# Patient Record
Sex: FEMALE | Race: WHITE | Employment: FULL TIME | URBAN - METROPOLITAN AREA
[De-identification: names, ages, dates, MRNs, and addresses within clinical notes are randomized per-mention and may not be internally consistent; named-entity substitution may affect disease eponyms.]

---

## 2017-02-16 ENCOUNTER — OFFICE VISIT (OUTPATIENT)
Dept: NEUROLOGY | Facility: CLINIC | Age: 52
End: 2017-02-16

## 2017-02-16 VITALS — DIASTOLIC BLOOD PRESSURE: 86 MMHG | SYSTOLIC BLOOD PRESSURE: 122 MMHG | HEART RATE: 82 BPM | RESPIRATION RATE: 18 BRPM

## 2017-02-16 DIAGNOSIS — G44.219 EPISODIC TENSION-TYPE HEADACHE, NOT INTRACTABLE: Primary | ICD-10-CM

## 2017-02-16 RX ORDER — INDOMETHACIN 25 MG/1
25 CAPSULE ORAL SEE ADMIN INSTRUCTIONS
Qty: 30 CAPSULE | Refills: 1 | Status: SHIPPED | OUTPATIENT
Start: 2017-02-16 | End: 2021-11-10

## 2017-02-16 NOTE — MR AVS SNAPSHOT
After Visit Summary   2017    Estephania Vázquez    MRN: [id number]           Patient Information     Date Of Birth          1965        Visit Information        Provider Department      2017 3:30 PM Benjamin Evans MD [facility]        Today's Diagnoses     Episodic tension-type headache, not intractable    -  1       Follow-ups after your visit        Follow-up notes from your care team     Return if symptoms worsen or fail to improve.      Who to contact     Please call your clinic at [phone] to:    Ask questions about your health    Make or cancel appointments    Discuss your medicines    Learn about your test results    Speak to your doctor   If you have compliments or concerns about an experience at your clinic, or if you wish to file a complaint, please contact [facility] Patient Relations at [phone] or email us at [email or web address]         Additional Information About Your Visit        MyChart Information     Arcivrt is an electronic gateway that provides easy, online access to your medical records. With Gyft, you can request a clinic appointment, read your test results, renew a prescription or communicate with your care team.     To sign up for Arcivrt visit the website at [email or web address]   You will be asked to enter the access code listed below, as well as some personal information. Please follow the directions to create your username and password.     Your access code is: [email or web address]  Expires: 2017  1:35 PM     Your access code will  in 90 days. If you need help or a new code, please contact your [facility] or call [phone] for assistance.        Care EveryWhere ID     This is your Care EveryWhere ID. This could be used by other organizations to access your [place] medical records  [id number]        Your Vitals  Were     Pulse Respirations                82 18           Blood Pressure from Last 3 Encounters:   02/16/17 122/86    Weight from Last 3 Encounters:   11/11/14 70.3 kg (155 lb)                 Today's Medication Changes          These changes are accurate as of: 2/16/17 11:59 PM.  If you have any questions, ask your nurse or doctor.               Start taking these medicines.        Dose/Directions    indomethacin 25 MG capsule   Commonly known as:  INDOCIN   Used for:  Episodic tension-type headache, not intractable        Dose:  25 mg   Take 1 capsule (25 mg) by mouth See Admin Instructions Take one or two prior to sex.   Quantity:  30 capsule   Refills:  1            Where to get your medicines      These medications were sent to [facility] [address] [facility] [address] AT [address]  [address] [facility] [address]     Phone:  [phone]     indomethacin 25 MG capsule                Primary Care Provider Office Phone # Fax #    Abhinav Zelaya [phone]       [place] [id number]  [facility] [address]        Thank you!     Thank you for choosing [facility]  for your care. Our goal is always to provide you with excellent care. Hearing back from our patients is one way we can continue to improve our services. Please take a few minutes to complete the written survey that you may receive in the mail after your visit with us. Thank you!             Your Updated Medication List - Protect others around you: Learn how to safely use, store and throw away your medicines at www.disposemymeds.org.          This list is accurate as of: 2/16/17 11:59 PM.  Always use your most recent med list.                   Brand Name Dispense Instructions for use    DIAZEPAM PO      Take 5 mg by mouth At Bedtime       IBUPROFEN PO      Take 200 mg by mouth 3 times daily        indomethacin 25 MG capsule    INDOCIN    30 capsule    Take 1 capsule (25 mg) by mouth See Admin Instructions Take one or two prior to sex.

## 2017-02-16 NOTE — LETTER
2017       RE: Estephania Vázquez  [address]   [place] [facility] [place] [address]     Dear Colleague,    Thank you for referring your patient, Estephania Vázquez, to the [facility] at [facility]. Please see a copy of my visit note below.    2017      Abhinav Zelaya MD   [facility]    [address] [facility] [place] [address]      RE: Estephania Vázquez   MRN: [id number]   : 1965      Dear Abhinav:      Thank you for the referral of Estephania Vázquez for neurologic consultation with chief complaint of headache.  As you know, she is a 51-year-old right-handed woman.  She had headaches a year ago and now is having them again.  They are very specific.  They occur at sexual climax.  When she first had the problem a year ago she had several such events and they were predictable.  She never had sex without the headache.  Then, after a couple of months or so, they seemed to go away.  Now they are back the last couple of weeks.  The time from onset to maximum severity is a matter of seconds.  They begin explosively.  It is a global type headache.  With the most recent such event, she took 4 ibuprofen and then got a massage.  She fell asleep.  When she awoke the headache was gone.  This is pretty typical.  She does not have other neurologic symptoms with the headache.  She has no problem with vision, hearing, ringing in the ears, vertigo, speech or swallowing.  She does not get focal symptoms in her arms or legs such as numbness, tingling, pins or needles.  She has not lost consciousness.  She does not have issues with bowel or bladder control.      Her past medical history is largely noncontributory.  She does not have high blood pressure, diabetes, thyroid or asthma.  She does take an occasional Valium for restless legs syndrome to good effect.  She has not had pertinent surgery.  She  does smoke 4-5 cigarettes a day and is trying to quit.  She does not drink.  She has no history of head injury.  Social history is that she is .  She has 4 children.  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  She also is raising her 10-year-old granddaughter.  Family history is positive for headache in her father.      On examination, the patient is cooperative and in no distress.  Her blood pressure is 122/86.  There are no carotid bruits.  Auscultation of the heart shows S1, S2, without murmur, rub or gallop.  Chest is clear to auscultation.  On neurologic exam, the patient is alert, oriented and lucid.  Cranial nerve testing shows full visual fields to confrontation.  Funduscopic exam shows sharp discs bilaterally.  Visual acuity is 20/20 bilaterally.  Eye movements are complete and conjugate without nystagmus.  Pupils react to light.  Facies are symmetric, and tongue protrudes in the midline.  Motor evaluation shows no pronator drift, normal finger tapping, finger-nose-finger and heel-knee-shin.  She has good strength in the arms, hands and legs.  Muscle stretch reflexes are low amplitude-trace and symmetric.  Toes are downgoing.  Sensory exam shows preserved vibration and temperature in the hands and preserved vibration in the toes.  Romberg sign is absent.  She can walk on her heels and toes without difficulty.  Tandem gait is unremarkable.      ASSESSMENT:      1.  Coital headache.      DISCUSSION:  This patient is seen for evaluation headache associated with sexual climax.  She had this problem a year ago and then it went away and now it is back in the last few weeks.  Her exam on this point is normal.  I suspect this is a benign primary headache disorder.  For evaluation, I am going to obtain an MRI scan of the brain along with an MR angiogram of the head and neck to make sure there is no structural lesion, aneurysm or dissection.  I am  starting her on indomethacin to take 25 or 50 mg 30-60 minutes before sex to see if that can work as a preventive.  I will communicate the results of her imaging studies to her and followup will be arranged at that time.  If you have questions about this, please contact me.      Sincerely,      Benjamin Evans MD        D: 2017 15:57   T: 2017 16:34   MT: DINA      Name:     MONICA DELATORRE   MRN:      [id number]        Account:      [id number]   :      1965           Service Date: 2017      Document: [id number]

## 2017-02-16 NOTE — PROGRESS NOTES
2017      Abhinav Zelaya MD   [facility]    [address] [place] [address]      RE: Estephania Vázquez   MRN: [id number]   : 1965      Dear Abhinav:      Thank you for the referral of Estephania Vázquez for neurologic consultation with chief complaint of headache.  As you know, she is a 51-year-old right-handed woman.  She had headaches a year ago and now is having them again.  They are very specific.  They occur at sexual climax.  When she first had the problem a year ago she had several such events and they were predictable.  She never had sex without the headache.  Then, after a couple of months or so, they seemed to go away.  Now they are back the last couple of weeks.  The time from onset to maximum severity is a matter of seconds.  They begin explosively.  It is a global type headache.  With the most recent such event, she took 4 ibuprofen and then got a massage.  She fell asleep.  When she awoke the headache was gone.  This is pretty typical.  She does not have other neurologic symptoms with the headache.  She has no problem with vision, hearing, ringing in the ears, vertigo, speech or swallowing.  She does not get focal symptoms in her arms or legs such as numbness, tingling, pins or needles.  She has not lost consciousness.  She does not have issues with bowel or bladder control.      Her past medical history is largely noncontributory.  She does not have high blood pressure, diabetes, thyroid or asthma.  She does take an occasional Valium for restless legs syndrome to good effect.  She has not had pertinent surgery.  She does smoke 4-5 cigarettes a day and is trying to quit.  She does not drink.  She has no history of head injury.  Social history is that she is .  She has 4 children.  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  She also is raising her 10-year-old granddaughter.  Family  history is positive for headache in her father.      On examination, the patient is cooperative and in no distress.  Her blood pressure is 122/86.  There are no carotid bruits.  Auscultation of the heart shows S1, S2, without murmur, rub or gallop.  Chest is clear to auscultation.  On neurologic exam, the patient is alert, oriented and lucid.  Cranial nerve testing shows full visual fields to confrontation.  Funduscopic exam shows sharp discs bilaterally.  Visual acuity is 20/20 bilaterally.  Eye movements are complete and conjugate without nystagmus.  Pupils react to light.  Facies are symmetric, and tongue protrudes in the midline.  Motor evaluation shows no pronator drift, normal finger tapping, finger-nose-finger and heel-knee-shin.  She has good strength in the arms, hands and legs.  Muscle stretch reflexes are low amplitude-trace and symmetric.  Toes are downgoing.  Sensory exam shows preserved vibration and temperature in the hands and preserved vibration in the toes.  Romberg sign is absent.  She can walk on her heels and toes without difficulty.  Tandem gait is unremarkable.      ASSESSMENT:      1.  Coital headache.      DISCUSSION:  This patient is seen for evaluation headache associated with sexual climax.  She had this problem a year ago and then it went away and now it is back in the last few weeks.  Her exam on this point is normal.  I suspect this is a benign primary headache disorder.  For evaluation, I am going to obtain an MRI scan of the brain along with an MR angiogram of the head and neck to make sure there is no structural lesion, aneurysm or dissection.  I am starting her on indomethacin to take 25 or 50 mg 30-60 minutes before sex to see if that can work as a preventive.  I will communicate the results of her imaging studies to her and followup will be arranged at that time.  If you have questions about this, please contact me.      Sincerely,      MD DIANN Vazquez  MD WILL             D: 2017 15:57   T: 2017 16:34   MT: DINA      Name:     MONICA DELATORRE   MRN:      [id number]        Account:      [id number]   :      1965           Service Date: 2017      Document: [id number]          3/13 Addendum: reviewed normal MRI brain and MRA head and neck with pt.  Indomethacin not very helpful, but headaches seem to have resolved.  She will keep me posted and f/u prn.

## 2017-03-02 ENCOUNTER — TRANSFERRED RECORDS (OUTPATIENT)
Dept: HEALTH INFORMATION MANAGEMENT | Facility: CLINIC | Age: 52
End: 2017-03-02

## 2021-11-10 ENCOUNTER — APPOINTMENT (OUTPATIENT)
Dept: GENERAL RADIOLOGY | Facility: CLINIC | Age: 56
End: 2021-11-10
Attending: EMERGENCY MEDICINE
Payer: COMMERCIAL

## 2021-11-10 ENCOUNTER — HOSPITAL ENCOUNTER (OUTPATIENT)
Facility: CLINIC | Age: 56
Setting detail: OBSERVATION
Discharge: HOME | End: 2021-11-11
Attending: EMERGENCY MEDICINE | Admitting: INTERNAL MEDICINE
Payer: COMMERCIAL

## 2021-11-10 DIAGNOSIS — R07.9 CHEST PAIN, UNSPECIFIED TYPE: ICD-10-CM

## 2021-11-10 LAB
ALBUMIN SERPL-MCNC: 3.4 G/DL (ref 3.4–5)
ALP SERPL-CCNC: 136 U/L (ref 40–150)
ALT SERPL W P-5'-P-CCNC: 24 U/L (ref 0–50)
ANION GAP SERPL CALCULATED.3IONS-SCNC: 7 MMOL/L (ref 3–14)
AST SERPL W P-5'-P-CCNC: 10 U/L (ref 0–45)
ATRIAL RATE - MUSE: 88 BPM
BASOPHILS # BLD AUTO: 0.1 10E3/UL (ref 0–0.2)
BASOPHILS NFR BLD AUTO: 1 %
BILIRUB SERPL-MCNC: 0.3 MG/DL (ref 0.2–1.3)
BUN SERPL-MCNC: 10 MG/DL (ref 7–30)
CALCIUM SERPL-MCNC: 9 MG/DL (ref 8.5–10.1)
CHLORIDE BLD-SCNC: 109 MMOL/L (ref 94–109)
CO2 SERPL-SCNC: 23 MMOL/L (ref 20–32)
CREAT SERPL-MCNC: 0.73 MG/DL (ref 0.52–1.04)
DIASTOLIC BLOOD PRESSURE - MUSE: NORMAL MMHG
EOSINOPHIL # BLD AUTO: 0.2 10E3/UL (ref 0–0.7)
EOSINOPHIL NFR BLD AUTO: 2 %
ERYTHROCYTE [DISTWIDTH] IN BLOOD BY AUTOMATED COUNT: 12.4 % (ref 10–15)
GFR SERPL CREATININE-BSD FRML MDRD: >90 ML/MIN/1.73M2
GLUCOSE BLD-MCNC: 116 MG/DL (ref 70–99)
HBA1C MFR BLD: 5.6 % (ref 0–5.6)
HCT VFR BLD AUTO: 39.7 % (ref 35–47)
HGB BLD-MCNC: 12.9 G/DL (ref 11.7–15.7)
IMM GRANULOCYTES # BLD: 0 10E3/UL
IMM GRANULOCYTES NFR BLD: 0 %
INTERPRETATION ECG - MUSE: NORMAL
LYMPHOCYTES # BLD AUTO: 2.1 10E3/UL (ref 0.8–5.3)
LYMPHOCYTES NFR BLD AUTO: 25 %
MCH RBC QN AUTO: 28.9 PG (ref 26.5–33)
MCHC RBC AUTO-ENTMCNC: 32.5 G/DL (ref 31.5–36.5)
MCV RBC AUTO: 89 FL (ref 78–100)
MONOCYTES # BLD AUTO: 0.5 10E3/UL (ref 0–1.3)
MONOCYTES NFR BLD AUTO: 6 %
NEUTROPHILS # BLD AUTO: 5.4 10E3/UL (ref 1.6–8.3)
NEUTROPHILS NFR BLD AUTO: 66 %
NRBC # BLD AUTO: 0 10E3/UL
NRBC BLD AUTO-RTO: 0 /100
P AXIS - MUSE: 60 DEGREES
PLATELET # BLD AUTO: 389 10E3/UL (ref 150–450)
POTASSIUM BLD-SCNC: 3.6 MMOL/L (ref 3.4–5.3)
PR INTERVAL - MUSE: 160 MS
PROT SERPL-MCNC: 7.4 G/DL (ref 6.8–8.8)
QRS DURATION - MUSE: 80 MS
QT - MUSE: 378 MS
QTC - MUSE: 457 MS
R AXIS - MUSE: 32 DEGREES
RBC # BLD AUTO: 4.46 10E6/UL (ref 3.8–5.2)
SARS-COV-2 RNA RESP QL NAA+PROBE: NEGATIVE
SODIUM SERPL-SCNC: 139 MMOL/L (ref 133–144)
SYSTOLIC BLOOD PRESSURE - MUSE: NORMAL MMHG
T AXIS - MUSE: 43 DEGREES
TROPONIN I SERPL-MCNC: <0.015 UG/L (ref 0–0.04)
VENTRICULAR RATE- MUSE: 88 BPM
WBC # BLD AUTO: 8.3 10E3/UL (ref 4–11)

## 2021-11-10 PROCEDURE — 87635 SARS-COV-2 COVID-19 AMP PRB: CPT | Performed by: EMERGENCY MEDICINE

## 2021-11-10 PROCEDURE — 82040 ASSAY OF SERUM ALBUMIN: CPT | Performed by: EMERGENCY MEDICINE

## 2021-11-10 PROCEDURE — 99285 EMERGENCY DEPT VISIT HI MDM: CPT | Mod: 25

## 2021-11-10 PROCEDURE — 36415 COLL VENOUS BLD VENIPUNCTURE: CPT | Performed by: EMERGENCY MEDICINE

## 2021-11-10 PROCEDURE — 71046 X-RAY EXAM CHEST 2 VIEWS: CPT

## 2021-11-10 PROCEDURE — 36415 COLL VENOUS BLD VENIPUNCTURE: CPT | Performed by: INTERNAL MEDICINE

## 2021-11-10 PROCEDURE — 250N000013 HC RX MED GY IP 250 OP 250 PS 637: Performed by: EMERGENCY MEDICINE

## 2021-11-10 PROCEDURE — 250N000013 HC RX MED GY IP 250 OP 250 PS 637: Performed by: INTERNAL MEDICINE

## 2021-11-10 PROCEDURE — 93005 ELECTROCARDIOGRAM TRACING: CPT

## 2021-11-10 PROCEDURE — G0378 HOSPITAL OBSERVATION PER HR: HCPCS

## 2021-11-10 PROCEDURE — C9803 HOPD COVID-19 SPEC COLLECT: HCPCS

## 2021-11-10 PROCEDURE — 83036 HEMOGLOBIN GLYCOSYLATED A1C: CPT | Performed by: INTERNAL MEDICINE

## 2021-11-10 PROCEDURE — 84484 ASSAY OF TROPONIN QUANT: CPT | Mod: 91 | Performed by: INTERNAL MEDICINE

## 2021-11-10 PROCEDURE — 85025 COMPLETE CBC W/AUTO DIFF WBC: CPT | Performed by: EMERGENCY MEDICINE

## 2021-11-10 PROCEDURE — 84484 ASSAY OF TROPONIN QUANT: CPT | Performed by: EMERGENCY MEDICINE

## 2021-11-10 PROCEDURE — 99220 PR INITIAL OBSERVATION CARE,LEVEL III: CPT | Performed by: INTERNAL MEDICINE

## 2021-11-10 RX ORDER — CYCLOBENZAPRINE HCL 10 MG
10 TABLET ORAL EVERY 8 HOURS PRN
Status: DISCONTINUED | OUTPATIENT
Start: 2021-11-10 | End: 2021-11-11 | Stop reason: HOSPADM

## 2021-11-10 RX ORDER — ASPIRIN 81 MG/1
81 TABLET ORAL DAILY
Status: DISCONTINUED | OUTPATIENT
Start: 2021-11-11 | End: 2021-11-11 | Stop reason: HOSPADM

## 2021-11-10 RX ORDER — ACETAMINOPHEN 325 MG/1
650 TABLET ORAL EVERY 6 HOURS PRN
Status: DISCONTINUED | OUTPATIENT
Start: 2021-11-10 | End: 2021-11-11 | Stop reason: HOSPADM

## 2021-11-10 RX ORDER — MAGNESIUM HYDROXIDE/ALUMINUM HYDROXICE/SIMETHICONE 120; 1200; 1200 MG/30ML; MG/30ML; MG/30ML
30 SUSPENSION ORAL EVERY 4 HOURS PRN
Status: DISCONTINUED | OUTPATIENT
Start: 2021-11-10 | End: 2021-11-11 | Stop reason: HOSPADM

## 2021-11-10 RX ORDER — NITROGLYCERIN 0.4 MG/1
0.4 TABLET SUBLINGUAL EVERY 5 MIN PRN
Status: DISCONTINUED | OUTPATIENT
Start: 2021-11-10 | End: 2021-11-11 | Stop reason: HOSPADM

## 2021-11-10 RX ORDER — NITROGLYCERIN 0.4 MG/1
0.4 TABLET SUBLINGUAL EVERY 5 MIN PRN
Status: DISCONTINUED | OUTPATIENT
Start: 2021-11-10 | End: 2021-11-10

## 2021-11-10 RX ORDER — ACETAMINOPHEN 650 MG/1
650 SUPPOSITORY RECTAL EVERY 6 HOURS PRN
Status: DISCONTINUED | OUTPATIENT
Start: 2021-11-10 | End: 2021-11-11 | Stop reason: HOSPADM

## 2021-11-10 RX ADMIN — ACETAMINOPHEN 650 MG: 325 TABLET, FILM COATED ORAL at 21:55

## 2021-11-10 RX ADMIN — CYCLOBENZAPRINE 10 MG: 10 TABLET, FILM COATED ORAL at 22:42

## 2021-11-10 RX ADMIN — NITROGLYCERIN 0.4 MG: 0.4 TABLET SUBLINGUAL at 15:23

## 2021-11-10 ASSESSMENT — MIFFLIN-ST. JEOR: SCORE: 1347.92

## 2021-11-10 ASSESSMENT — ENCOUNTER SYMPTOMS
SHORTNESS OF BREATH: 1
DIAPHORESIS: 1

## 2021-11-10 NOTE — ED TRIAGE NOTES
EMS report: while driving had sudden onset of crushing chest pain from base of ribs up into jaw, became diaphoretic. Reduced prior to EMS arrival but then it came back. EMS gave nitroglycerin x2 (worst pain 9/10), currently 1-2/10. Aspirin 324 mg PO given by EMS. . PIV 20g L wrist

## 2021-11-10 NOTE — ED NOTES
Bed: ED03  Expected date:   Expected time:   Means of arrival:   Comments:  [address] [facility] 56 F chest pain

## 2021-11-10 NOTE — H&P
[facility]    History and Physical - Hospitalist Service       Date of Admission:  11/10/2021    Assessment & Plan   Estephania Vázquez is a 56 year-old female with tobacco use, family history of coronary artery disease who presents with episode of chest pain while driving. Admitted on 11/10/2021.    Chest pain  *Presents with episode of bilateral lower chest pain radiating into her sternum and jaw occurring while at rest driving. Pain began to improve spontaneously prior to EMS arrival, though did further improve following NTG.   *EKG without ischemic changes, troponin negative x2  *Risk factors for CAD include family history, ongoing tobacco use  *History of equivocal stress echocardiogram 3/2009  - Serial troponins  - NM chemical stress test in AM, she does not feel she can exercise adequately due to chronic low back symptoms   - Telemetry   - Continue aspirin daily  - NTG SL PRN  - Check HgbA1c, lipid panel for further risk stratification     Tobacco use disorder  - Discussed importance of cessation  - Declines nicotine replacement therapy     Chronic low back pain  - Typically manages with massages from her  at home, TENS unit  - Okay for home TENS unit if available  - Heat, ice, acetaminophen PRN        Diet: Combination Diet Regular Diet Adult; No Caffeine Diet  DVT Prophylaxis: Observation patient, short stay anticipated   Marvin Catheter: Not present  Code Status: Full Code      Disposition Plan   [place] to observation status. Anticipate discharge within 24 hours if clinically improved.     Entered: Tino Wright MD 11/10/2021, 7:44 PM     The patient's care was discussed with the Patient.    Clinically Significant Risk Factors Present on Admission                        Tino Wright MD  [facility]    ______________________________________________________________________    Chief Complaint   Chest pain for 1 day    History of Present Illness    Estephania Vázquez is a 56 year old female who presents with the above chief complaint.    History is obtained from the patient and review of medical record. The patient reports [place] [address] when she felt the acute onset of chest pain that began at the bottom of her ribcage on both sides, and radiated up through her sternum into her jaw. She describes the pain as a pulling, tightness sensation. She subsequently developed associated shortness of breath and diaphoresis.  She pulled over and called for help.  By the time of EMS arrival, she reports her pain had already been improving, however improved further from a 2-3 out of 10 to a 1 out of 10 after nitroglycerin was given.  Her pain subsequently increased again, improved again following an additional nitroglycerin.  She currently reports her pain has nearly resolved, she has a residual abnormal sensation that she would not quite consider pain or discomfort.  She has a history of chronic low back pain which she attributes to years of lifting her disabled son.  Typically manages with massages from her  and a TENS unit at home.  Her pain is currently exacerbated as she has not had these usual therapies.  She otherwise has been able to ambulate at her recent baseline without any limitations from chest pain or shortness of breath.  She has no known personal history of coronary artery disease.  She is an active smoker.  She denies any known history of diabetes, hypertension, hyperlipidemia.  She has a family history of coronary artery disease including her father and her brother who both had MIs in their late 40s or early 50s.    In the Emergency Department, the patient was seen by Dr. Sigala, with whom I discussed the patient's presenting symptoms and emergency department course.  Initial vital signs were a temperature of 98.2F, HR 91, /94, RR 18, SpO2 97% on room air. Work-up included CXR with without acute abnormalities, normal EKG,  undetectable troponin. Hospitalists were contacted for admission to the hospital.     Review of Systems    Complete 10 point review of systems assessed and negative except as noted in HPI.    Past Medical History    I have reviewed this patient's medical history and updated it with pertinent information if needed.   Past Medical History:   Diagnosis Date     Chronic low back pain        Past Surgical History   I have reviewed this patient's surgical history and updated it with pertinent information if needed.  History reviewed. No pertinent surgical history.      Social History   I have reviewed this patient's social history and updated it with pertinent information if needed.  Social History     Tobacco Use     Smoking status: Current Every Day Smoker     Smokeless tobacco: Never Used   Substance Use Topics     Alcohol use: Not Currently     Drug use: None     . Has 4 children.     Family History   I have reviewed this patient's family history and updated it with pertinent information if needed.   Family History   Problem Relation Age of Onset     Esophageal Cancer Mother      Colon Cancer Mother      Myocardial Infarction Father         50s     Myocardial Infarction Brother         late 40s or early 50s         Prior to Admission Medications   None     Allergies   No Known Allergies    Physical Exam   Vital Signs: Temp: 98.2  F (36.8  C) Temp src: Oral BP: 122/83 Pulse: 77   Resp: 16 SpO2: 98 % O2 Device: None (Room air)    Weight: 168 lbs 0 oz    Constitutional: Well-appearing, NAD  Eyes: PERRL, EOMI  HENT: Oropharynx clear, MMM  Respiratory: Clear to auscultation bilaterally, good air movement, normal effort   Cardiovascular: RRR. No peripheral edema.   GI: Soft, non-tender, non-distended. No rebound tenderness or guarding.    Skin: Warm, dry   Neurologic: Alert. Responding to questions appropriately. Following commands.   Psychiatric: Normal affect, appropriate      Data   Data reviewed today: I reviewed  all medications, new labs and imaging results over the last 24 hours. I personally reviewed the EKG tracing showing sinus rhythm, no ischemic ST-T wave changes.    Recent Labs   Lab 11/10/21  1714 11/10/21  1456   WBC  --  8.3   HGB  --  12.9   MCV  --  89   PLT  --  389   NA  --  139   POTASSIUM  --  3.6   CHLORIDE  --  109   CO2  --  23   BUN  --  10   CR  --  0.73   ANIONGAP  --  7   KELSEY  --  9.0   GLC  --  116*   ALBUMIN  --  3.4   PROTTOTAL  --  7.4   BILITOTAL  --  0.3   ALKPHOS  --  136   ALT  --  24   AST  --  10   TROPONIN <0.015 <0.015       Recent Results (from the past 24 hour(s))   XR Chest 2 Views    Narrative    CHEST TWO VIEWS  11/10/2021 3:11 PM     HISTORY:  Chest pain.    COMPARISON: Chest CT on 3/27/2009      Impression    IMPRESSION: PA and lateral views of the chest were obtained.  Cardiomediastinal silhouette is within normal limits. No suspicious  focal pulmonary opacities. No significant pleural effusion or  pneumothorax.    MURIEL STONE MD         SYSTEM ID:  [id number]

## 2021-11-10 NOTE — ED NOTES
"[facility]  ED Nurse Handoff Report    ED Chief complaint: Chest Pain      ED Diagnosis:   Final diagnoses:   None       Code Status: Full Code    Allergies: No Known Allergies    Patient Story: Presents with midsternal chest pain while driving today. Pain radiated to jaw and right ear. EMS administered nitroglycerin x1 with reduced pain.     Focused Assessment:  A&Ox4, Chest pain 2/10 on arrival. Nitroglycerin SL x1 here relieved chest pain. Breath sounds clear to auscultation. NSR on bedside cardiac monitor. VS stable     Treatments and/or interventions provided: labs, nitroglycerin SL.   Patient's response to treatments and/or interventions: stable, improved    To be done/followed up on inpatient unit:  admission orders    Does this patient have any cognitive concerns?: Disorientation to person and none    Activity level - Baseline/Home:  Independent  Activity Level - Current:   Independent    Patient's Preferred language: English   Needed?: No    Isolation: None  Infection: Not Applicable  Patient tested for COVID 19 prior to admission: YES  Bariatric?: No    Vital Signs:   Vitals:    11/10/21 1452 11/10/21 1600   BP: (!) 125/94 123/86   Pulse: 91 84   Resp: 18 16   Temp: 98.2  F (36.8  C)    TempSrc: Oral    SpO2: 97% 99%   Weight: 76.2 kg (168 lb)    Height: 1.651 m (5' 5\")        Labs Ordered and Resulted from Time of ED Arrival to Time of ED Departure   COMPREHENSIVE METABOLIC PANEL - Abnormal       Result Value    Sodium 139      Potassium 3.6      Chloride 109      Carbon Dioxide (CO2) 23      Anion Gap 7      Urea Nitrogen 10      Creatinine 0.73      Calcium 9.0      Glucose 116 (*)     Alkaline Phosphatase 136      AST 10      ALT 24      Protein Total 7.4      Albumin 3.4      Bilirubin Total 0.3      GFR Estimate >90     TROPONIN I - Normal    Troponin I <0.015     CBC WITH PLATELETS AND DIFFERENTIAL    WBC Count 8.3      RBC Count 4.46      Hemoglobin 12.9      Hematocrit " 39.7      MCV 89      MCH 28.9      MCHC 32.5      RDW 12.4      Platelet Count 389      % Neutrophils 66      % Lymphocytes 25      % Monocytes 6      % Eosinophils 2      % Basophils 1      % Immature Granulocytes 0      NRBCs per 100 WBC 0      Absolute Neutrophils 5.4      Absolute Lymphocytes 2.1      Absolute Monocytes 0.5      Absolute Eosinophils 0.2      Absolute Basophils 0.1      Absolute Immature Granulocytes 0.0      Absolute NRBCs 0.0         Cardiac Rhythm:Cardiac Rhythm: Normal sinus rhythm    Was the PSS-3 completed:   Yes  What interventions are required if any?               Family Comments:  at bedside  OBS brochure/video discussed/provided to patient/family: N/A              Name of person given brochure if not patient: n/a              Relationship to patient: n/a    For the majority of the shift this patient's behavior was Green.   Behavioral interventions performed were none.    ED NURSE PHONE NUMBER: *[phone]

## 2021-11-10 NOTE — PHARMACY-ADMISSION MEDICATION HISTORY
Pharmacy Medication History  Admission medication history interview status for the 11/10/2021  admission is complete. See EPIC admission navigator for prior to admission medications     Location of Interview: Outside patient room but on unit  Medication history sources: Patient    Additional medication history information:   Patient denies current use of medication.    Medication reconciliation completed by provider prior to medication history? N/A    Time spent in this activity: 10 minutes    The information provided in this note is only as accurate as the sources available at the time of update(s)

## 2021-11-10 NOTE — ED PROVIDER NOTES
"  History     Chief Complaint:  Chest Pain      The history is provided by the patient.      Estephania Vázquez is a 56 year old female who presents with chest pain. The patient was driving when she had a sudden onset of severe chest pain, and rates it as a 9/10. The pain originated in her lower ribs and radiated up to her esophagus, and described the pain as \"someone trying to pull on my throat\". She then developed became diaphoretic and was short of breath. She then pulled over into a parking lot and called EMS. Her pain improved prior EMS's arrival, but it came back. She was given 2 doses of nitroglycerin, which provided relief. She was also given 324 mg of Asprin. At bedside, she rates her pain as a 2/10. She notes this pain does not feel similar to esophageal reflux. She has a strong history of myocardial infarction including her father, mother, brother, and sister. She is not currently on any medications. She denies recent exercise, but states that she is active and \"moves around a lot\".       Review of Systems   Constitutional: Positive for diaphoresis.   Respiratory: Positive for shortness of breath.    Cardiovascular: Positive for chest pain.   All other systems reviewed and are negative.      Allergies:  No Known Allergies    Medications:    Patient denies current use of medications.     Past Medical History:    Presbyopia   Regular astigmatism, bilateral   Myopia, bilateral  Family history of colon cancer  Colonic polyps  SI (sacroiliac) joint dysfunction   Chronic left-sided low back pain without sciatica   Restless legs syndrome (RLS)   Anemia, unspecified  Ankle instability   Dysmenorrhea  Tobacco use disorder      Past Surgical History:    Tubal ligation   Hysterectomy   Salpingo-oophorectomy     Family History:    Father - asthma, MI  Mother - esophageal cancer, colon cancer, depression, thyroid disease, MI  Sister - MI  Brother - MI    Social History:  Patient was unaccompanied to the ED.  Hx of " "tobacco use     Physical Exam     Patient Vitals for the past 24 hrs:   BP Temp Temp src Pulse Resp SpO2 Height Weight   11/10/21 1600 123/86 -- -- 84 16 99 % -- --   11/10/21 1452 (!) 125/94 98.2  F (36.8  C) Oral 91 18 97 % 1.651 m (5' 5\") 76.2 kg (168 lb)       Physical Exam  Vitals: reviewed by me  General: Pt seen on [facility] [place], cooperative, and alert to conversation  Eyes: Tracking well, clear conjunctiva BL  ENT: MMM, midline trachea.   Lungs: No tachypnea, no accessory muscle use. No respiratory distress.   CV: Rate as above  Abd: Soft, non tender, no guarding, no rebound. Non distended  MSK: no joint effusion.  No evidence of trauma  Skin: No rash  Neuro: Clear speech and no facial droop.  Psych: Not RIS, no e/o AH/VH      Emergency Department Course   EC  ECG taken at 1455, ECG read at 1455  Normal sinus rhythm   Normal ECG   Rate 88 bpm. GA interval 160 ms. QRS duration 80 ms. QT/QTc 378/457 ms. P-R-T axes 60 32 43.     Imaging:  XR Chest 2 Views  Final Result  IMPRESSION: PA and lateral views of the chest were obtained.  Cardiomediastinal silhouette is within normal limits. No suspicious  focal pulmonary opacities. No significant pleural effusion or  pneumothorax.    MURIEL STONE MD      Per Radiology     Laboratory:  Labs Ordered and Resulted from Time of ED Arrival to Time of ED Departure   COMPREHENSIVE METABOLIC PANEL - Abnormal       Result Value    Sodium 139      Potassium 3.6      Chloride 109      Carbon Dioxide (CO2) 23      Anion Gap 7      Urea Nitrogen 10      Creatinine 0.73      Calcium 9.0      Glucose 116 (*)     Alkaline Phosphatase 136      AST 10      ALT 24      Protein Total 7.4      Albumin 3.4      Bilirubin Total 0.3      GFR Estimate >90     TROPONIN I - Normal    Troponin I <0.015     CBC WITH PLATELETS AND DIFFERENTIAL    WBC Count 8.3      RBC Count 4.46      Hemoglobin 12.9      Hematocrit 39.7      MCV 89      MCH 28.9      MCHC 32.5      RDW 12.4   "    Platelet Count 389      % Neutrophils 66      % Lymphocytes 25      % Monocytes 6      % Eosinophils 2      % Basophils 1      % Immature Granulocytes 0      NRBCs per 100 WBC 0      Absolute Neutrophils 5.4      Absolute Lymphocytes 2.1      Absolute Monocytes 0.5      Absolute Eosinophils 0.2      Absolute Basophils 0.1      Absolute Immature Granulocytes 0.0      Absolute NRBCs 0.0     COVID-19 VIRUS (CORONAVIRUS) BY PCR   TROPONIN I       Emergency Department Course:    Reviewed:  I reviewed nursing notes, vitals, past history and care everywhere    Assessments:  1446 I obtained history and examined the patient as noted above.   1712 I rechecked the patientand explained findings. I discussed plan for admission to the hospital.    Consults:   1632 I spoke with Dr. Wright from [facility], who accepts the patient for admission.    Interventions:  1523 Nitrostat 0.4 mg sublingual     Disposition:  The patient was discharged to home.    Impression & Plan    Medical Decision Making:  This is a pleasant 56-year-old female presents the emergency room with episodic chest pain.  She has a robust family history of MI, and given the presentation, and the fact that was nitro responsive, I am quite concerned.  I appreciate that her initial troponin is normal, and that her EKG is within normal limits as well, though I do think she needs to be admitted for a full cardiac rule out as well as provocative testing.  If she has any further pain, she will get a nitro drip, and possibly heparin, though at this time I think she stable for very close monitoring.  She feels comfortable this plan, and I spoke to Dr. Wright of the hospitalist team who very kindly agreed to accept care of the patient.    Covid-19  sEtephania Vázquez was evaluated during a global COVID-19 pandemic, which necessitated consideration that the patient might be at risk for infection with the SARS-CoV-2 virus that causes COVID-19.   Applicable  protocols for evaluation were followed during the patient's care.   COVID-19 was considered as part of the patient's evaluation. The plan for testing is:  a test was obtained during this visit.    Diagnosis:    ICD-10-CM    1. Chest pain, unspecified type  R07.9        Scribe Disclosure:  I, Lg Saucedo, am serving as a scribe at 2:46 PM on 11/10/2021 to document services personally performed by Barry Sigala MD based on my observations and the provider's statements to me.      Barry Sigala MD  11/10/21 2044

## 2021-11-11 ENCOUNTER — APPOINTMENT (OUTPATIENT)
Dept: NUCLEAR MEDICINE | Facility: CLINIC | Age: 56
End: 2021-11-11
Attending: INTERNAL MEDICINE
Payer: COMMERCIAL

## 2021-11-11 VITALS
BODY MASS INDEX: 27.99 KG/M2 | HEART RATE: 66 BPM | SYSTOLIC BLOOD PRESSURE: 110 MMHG | OXYGEN SATURATION: 97 % | DIASTOLIC BLOOD PRESSURE: 76 MMHG | HEIGHT: 65 IN | WEIGHT: 168 LBS | RESPIRATION RATE: 18 BRPM | TEMPERATURE: 97.9 F

## 2021-11-11 LAB
CHOLEST SERPL-MCNC: 242 MG/DL
CV STRESS MAX HR HE: 96
FASTING STATUS PATIENT QL REPORTED: YES
HDLC SERPL-MCNC: 47 MG/DL
HOLD SPECIMEN: NORMAL
LDLC SERPL CALC-MCNC: 156 MG/DL
NONHDLC SERPL-MCNC: 195 MG/DL
RATE PRESSURE PRODUCT: NORMAL
STRESS ECHO BASELINE DIASTOLIC HE: 72
STRESS ECHO BASELINE HR: 58 BPM
STRESS ECHO BASELINE SYSTOLIC BP: 110
STRESS ECHO CALCULATED PERCENT HR: 59 %
STRESS ECHO LAST STRESS DIASTOLIC BP: 79
STRESS ECHO LAST STRESS SYSTOLIC BP: 125
STRESS ECHO TARGET HR: 163
TRIGL SERPL-MCNC: 196 MG/DL

## 2021-11-11 PROCEDURE — 78452 HT MUSCLE IMAGE SPECT MULT: CPT

## 2021-11-11 PROCEDURE — 78452 HT MUSCLE IMAGE SPECT MULT: CPT | Mod: 26 | Performed by: INTERNAL MEDICINE

## 2021-11-11 PROCEDURE — G0378 HOSPITAL OBSERVATION PER HR: HCPCS

## 2021-11-11 PROCEDURE — 36415 COLL VENOUS BLD VENIPUNCTURE: CPT | Performed by: INTERNAL MEDICINE

## 2021-11-11 PROCEDURE — 250N000013 HC RX MED GY IP 250 OP 250 PS 637: Performed by: INTERNAL MEDICINE

## 2021-11-11 PROCEDURE — 999N000049 HC STATISTIC ECHO STRESS OR NM NPI

## 2021-11-11 PROCEDURE — 93016 CV STRESS TEST SUPVJ ONLY: CPT | Performed by: INTERNAL MEDICINE

## 2021-11-11 PROCEDURE — 99207 PR CDG-CODE CATEGORY CHANGED: CPT | Performed by: PHYSICIAN ASSISTANT

## 2021-11-11 PROCEDURE — 93018 CV STRESS TEST I&R ONLY: CPT | Performed by: INTERNAL MEDICINE

## 2021-11-11 PROCEDURE — 343N000001 HC RX 343: Performed by: INTERNAL MEDICINE

## 2021-11-11 PROCEDURE — A9502 TC99M TETROFOSMIN: HCPCS | Performed by: INTERNAL MEDICINE

## 2021-11-11 PROCEDURE — 250N000011 HC RX IP 250 OP 636: Performed by: INTERNAL MEDICINE

## 2021-11-11 PROCEDURE — 93017 CV STRESS TEST TRACING ONLY: CPT

## 2021-11-11 PROCEDURE — 99217 PR OBSERVATION CARE DISCHARGE: CPT | Performed by: PHYSICIAN ASSISTANT

## 2021-11-11 PROCEDURE — 82465 ASSAY BLD/SERUM CHOLESTEROL: CPT | Performed by: INTERNAL MEDICINE

## 2021-11-11 RX ORDER — REGADENOSON 0.08 MG/ML
0.4 INJECTION, SOLUTION INTRAVENOUS ONCE
Status: COMPLETED | OUTPATIENT
Start: 2021-11-11 | End: 2021-11-11

## 2021-11-11 RX ORDER — ALBUTEROL SULFATE 90 UG/1
2 AEROSOL, METERED RESPIRATORY (INHALATION) EVERY 5 MIN PRN
Status: DISCONTINUED | OUTPATIENT
Start: 2021-11-11 | End: 2021-11-11 | Stop reason: HOSPADM

## 2021-11-11 RX ORDER — NITROGLYCERIN 0.4 MG/1
TABLET SUBLINGUAL
Qty: 20 TABLET | Refills: 0 | Status: SHIPPED | OUTPATIENT
Start: 2021-11-11

## 2021-11-11 RX ORDER — AMINOPHYLLINE 25 MG/ML
50-100 INJECTION, SOLUTION INTRAVENOUS
Status: DISCONTINUED | OUTPATIENT
Start: 2021-11-11 | End: 2021-11-11 | Stop reason: HOSPADM

## 2021-11-11 RX ORDER — ATORVASTATIN CALCIUM 10 MG/1
10 TABLET, FILM COATED ORAL DAILY
Qty: 30 TABLET | Refills: 0 | Status: SHIPPED | OUTPATIENT
Start: 2021-11-11

## 2021-11-11 RX ORDER — CAFFEINE CITRATE 20 MG/ML
60 SOLUTION INTRAVENOUS
Status: DISCONTINUED | OUTPATIENT
Start: 2021-11-11 | End: 2021-11-11 | Stop reason: HOSPADM

## 2021-11-11 RX ORDER — ACYCLOVIR 200 MG/1
0-1 CAPSULE ORAL
Status: DISCONTINUED | OUTPATIENT
Start: 2021-11-11 | End: 2021-11-11 | Stop reason: HOSPADM

## 2021-11-11 RX ADMIN — TETROFOSMIN 11.4 MCI.: 1.38 INJECTION, POWDER, LYOPHILIZED, FOR SOLUTION INTRAVENOUS at 07:25

## 2021-11-11 RX ADMIN — REGADENOSON 0.4 MG: 0.08 INJECTION, SOLUTION INTRAVENOUS at 09:28

## 2021-11-11 RX ADMIN — TETROFOSMIN 33.3 MCI.: 1.38 INJECTION, POWDER, LYOPHILIZED, FOR SOLUTION INTRAVENOUS at 09:30

## 2021-11-11 RX ADMIN — ASPIRIN 81 MG: 81 TABLET, COATED ORAL at 08:03

## 2021-11-11 RX ADMIN — ACETAMINOPHEN 650 MG: 325 TABLET, FILM COATED ORAL at 05:06

## 2021-11-11 NOTE — DISCHARGE SUMMARY
[facility]    Discharge Summary  Hospitalist    Date of Admission:  11/10/2021  Date of Discharge:  11/11/2021  Discharging Provider: Corry Colvin PA-C    Discharge Diagnoses   Chest pain  HLD  Tobacco use  Chronic low back pain     History of Present Illness   Estephania Vázquez is an 56 year old female with a past medical history significant for tobacco use and chronic low back pain who presented to the Emergency Department following an episode of chest pain that occurred while she was driving.   No recurrence of pain since admission.     Hospital Course   Estephania Vázquez was admitted on 11/10/2021.  The following problems were addressed during her hospitalization:    Chest pain  Presents with episode of bilateral lower chest pain radiating into her sternum, neck, and jaw occurring while at rest driving. Reported associated dyspnea, diaphoresis. Pain began to improve spontaneously prior to EMS arrival, though did further improve following NTG.   Strong family history early CAD in father and brother.   *EKG without ischemic changes, troponin negative x3  *Risk factors for CAD include family history, ongoing tobacco use, HLD  *History of equivocal stress echocardiogram 3/2009  -Lexiscan performed 11/11 and negative for inducible ischemia   - start daily asa 81mg, moderate intensity statin. Will send with prn nitro as well. Advised she will need to be re-evaluated should she experience recurrent chest pain. Could consider Cardiology referral outpatient.     Dyslipidemia  Total cholesterol 242. . HDL 47.   --start atorvastatin 10mg daily given multiple risk factors      Tobacco use disorder  - Discussed importance of cessation  - Declines nicotine replacement therapy      Chronic low back pain  - Typically manages with massages from her  at home, TENS unit  - Heat, ice, acetaminophen PRN     Patient was discussed with Dr. Smith who agrees with the above plan     Corry  Rowan Colvin PA-C, MICHELLE    Significant Results and Procedures   See below     Code Status   Full Code       Primary Care Physician   Abhinav Zelaya    Physical Exam   Temp: 97.9  F (36.6  C) Temp src: Oral BP: 110/76 Pulse: 66   Resp: 18 SpO2: 97 % O2 Device: None (Room air)    Vitals:    11/10/21 1452   Weight: 76.2 kg (168 lb)     Vital Signs with Ranges  Temp:  [97.5  F (36.4  C)-98.2  F (36.8  C)] 97.9  F (36.6  C)  Pulse:  [56-91] 66  Resp:  [12-18] 18  BP: ()/(58-94) 110/76  SpO2:  [96 %-99 %] 97 %  No intake/output data recorded.    Constitutional: Alert and oriented, sitting up in bed. Appears comfortable and is appropriately conversant   ENT:  moist mucous membranes  Eyes:  Sclera anicteric, EOMI  Respiratory: Lungs clear to auscultation bilaterally, no increased work of breathing  Cardiovascular: Regular rate and rhythm, no murmur, no LE edema, distal pulses +2/4  MSK:  Chest wall non tender to palpation. Moves all four extremities. Normal tone  GI: active bowel sounds, abdomen soft, non-tender  Neuro:  Speech is clear. Face symmetric. Follows commands    Discharge Disposition   Discharged to home  Condition at discharge: Stable    Consultations This Hospital Stay   None    Time Spent on this Encounter   Corry ODEN PA-C, personally saw the patient today and spent greater than 30 minutes discharging this patient.    Discharge Orders      Reason for your hospital stay    You were here for evaluation of chest pain     Follow-up and recommended labs and tests     Follow up with your primary care provider within 7 days for hospital follow up. You will need to call and schedule this appointment.     Activity    Your activity upon discharge: activity as tolerated     When to contact your care team    You should be re-evaluated right away with recurrent chest pain     Diet    Follow this diet upon discharge: Orders Placed This Encounter      Regular Diet Adult     Discharge Medications    Current Discharge Medication List      START taking these medications    Details   aspirin (ASA) 81 MG EC tablet Take 1 tablet (81 mg) by mouth daily  Qty: 30 tablet, Refills: 0    Comments: Refills per PCP  Associated Diagnoses: Chest pain, unspecified type      atorvastatin (LIPITOR) 10 MG tablet Take 1 tablet (10 mg) by mouth daily  Qty: 30 tablet, Refills: 0    Comments: Refills per PCP  Associated Diagnoses: Chest pain, unspecified type      nitroGLYcerin (NITROSTAT) 0.4 MG sublingual tablet For chest pain place 1 tablet under the tongue every 5 minutes for 3 doses. If symptoms persist 5 minutes after 1st dose call 911.  Qty: 20 tablet, Refills: 0    Comments: Refills per PCP  Associated Diagnoses: Chest pain, unspecified type           Allergies   No Known Allergies  Data   Most Recent 3 CBC's:Recent Labs   Lab Test 11/10/21  1456   WBC 8.3   HGB 12.9   MCV 89         Most Recent 3 BMP's:  Recent Labs   Lab Test 11/10/21  1456      POTASSIUM 3.6   CHLORIDE 109   CO2 23   BUN 10   CR 0.73   ANIONGAP 7   KELSEY 9.0   *     Most Recent 2 LFT's:  Recent Labs   Lab Test 11/10/21  1456   AST 10   ALT 24   ALKPHOS 136   BILITOTAL 0.3     Most Recent INR's and Anticoagulation Dosing History:  Anticoagulation Dose History    There is no flowsheet data to display.       Most Recent 3 Troponin's:  Recent Labs   Lab Test 11/10/21  2233 11/10/21  1714 11/10/21  1456   TROPONIN <0.015 <0.015 <0.015     Most Recent Cholesterol Panel:  Recent Labs   Lab Test 11/11/21  0619   CHOL 242*   *   HDL 47*   TRIG 196*     Most Recent 6 Bacteria Isolates From Any Culture (See EPIC Reports for Culture Details):No lab results found.  Most Recent TSH, T4 and A1c Labs:  Recent Labs   Lab Test 11/10/21  1456   A1C 5.6     Results for orders placed or performed during the hospital encounter of 11/10/21   XR Chest 2 Views    Narrative    CHEST TWO VIEWS  11/10/2021 3:11 PM     HISTORY:  Chest pain.    COMPARISON:  Chest CT on 3/27/2009      Impression    IMPRESSION: PA and lateral views of the chest were obtained.  Cardiomediastinal silhouette is within normal limits. No suspicious  focal pulmonary opacities. No significant pleural effusion or  pneumothorax.    MURIEL STONE MD         SYSTEM ID:  [id number]   NM Lexiscan stress test (nuc card)     Value    Target     Baseline Systolic     Baseline Diastolic BP 72    Last Stress Systolic     Last Stress Diastolic BP 79    Baseline HR 58    Max HR  96    Max Perdicted HR  59    Rate Pressure Product 12,000.0    Narrative       The nuclear stress test is negative for inducible myocardial ischemia   or infarction.     Left ventricular function is normal.     There is no prior study for comparison.

## 2021-11-11 NOTE — PROVIDER NOTIFICATION
MD Notification    Notified Person: MD    Notified Person Name: VERÓNICA Wheatley    Notification Date/Time: 11/11/2021 1136    Notification Interaction: Web based paging    Purpose of Notification: Patient wants to eat can u put in diet, she also wants coffee if this is okay. Please advise, thank you.    Orders Received: patient can eat now, discharging    Comments:

## 2021-11-11 NOTE — PLAN OF CARE
Observation goals  PRIOR TO DISCHARGE        Comments: List all goals to be met before discharge home:     - Serial troponins and stress test complete: not met      - Seen and cleared by consultant if applicable: not met      - Adequate pain control on oral analgesia: partially met      - Vital signs normal or at patient baseline: met      - Safe disposition plan has been identified: not met      - Nurse to notify provider when observation goals have been met and patient is ready for discharge.

## 2021-11-11 NOTE — PLAN OF CARE
Observation goals  PRIOR TO DISCHARGE        Comments: List all goals to be met before discharge home:     - Serial troponins and stress test complete: not met      - Seen and cleared by consultant if applicable: not met      - Adequate pain control on oral analgesia: partially met      - Vital signs normal or at patient baseline: met      - Safe disposition plan has been identified: not met      - Nurse to notify provider when observation goals have been met and patient is ready for discharge.    A/O x4. VSS on RA. Denies chest pain. C/o back pain and muscle spasm, prn tylenol, cyclobenzaprine and heat pack given. Up independently. NPO @ 05:00am. Lexiscan today. MARCELA TOLENTINO.

## 2021-11-11 NOTE — PROVIDER NOTIFICATION
MD Notification    Notified Person: MD    Notified Person Name:  Kyle     Notification Date/Time: 11/10/21 @ 2143    Notification Interaction: pager     Purpose of Notification: FYI: pt requesting pain meds for back pain. Pt also requesting diazepam stating that she takes it at home for muscle spasms.    Orders Received:    Comments:

## 2021-11-11 NOTE — PROGRESS NOTES
Patient s/p lexiscan injection.  Tolerated with symptoms of nausea and SOB and slight headache.  Patient returned to short stay and encouraged to eat and drink caffeine if wanted.  Plan for NM pictures in 1 hour.

## 2021-11-11 NOTE — PLAN OF CARE
Observation (see obs goals). Vesta-scan completed. AOX4. Denies chest pain, numbness or tingling, or SOB. Denies headache or dizziness. C/O chronic back pain. NPO for Anthony-scan, output adequate. PIV SL.     Patient is discharging, discharge orders just put in.

## 2021-11-11 NOTE — PROGRESS NOTES
RECEIVING UNIT ED HANDOFF REVIEW    ED Nurse Handoff Report was reviewed by: Vane Platt RN on November 10, 2021 at 6:40 PM

## 2021-11-11 NOTE — PROGRESS NOTES
Observation goals  PRIOR TO DISCHARGE        Comments: List all goals to be met before discharge home:      - Serial troponins and stress test complete: Met     - Seen and cleared by consultant if applicable: Not Met     - Adequate pain control on oral analgesia: Met     - Vital signs normal or at patient baseline: Met      - Safe disposition plan has been identified: Not Met     - Nurse to notify provider when observation goals have been met and patient is ready for discharge.    -Patient needs to go down for Vesta-scan this AM

## (undated) RX ORDER — REGADENOSON 0.08 MG/ML
INJECTION, SOLUTION INTRAVENOUS
Status: DISPENSED
Start: 2021-11-11